# Patient Record
Sex: FEMALE | Race: WHITE | HISPANIC OR LATINO | Employment: OTHER | URBAN - METROPOLITAN AREA
[De-identification: names, ages, dates, MRNs, and addresses within clinical notes are randomized per-mention and may not be internally consistent; named-entity substitution may affect disease eponyms.]

---

## 2017-01-12 ENCOUNTER — ALLSCRIPTS OFFICE VISIT (OUTPATIENT)
Dept: OTHER | Facility: OTHER | Age: 47
End: 2017-01-12

## 2017-01-19 ENCOUNTER — GENERIC CONVERSION - ENCOUNTER (OUTPATIENT)
Dept: OTHER | Facility: OTHER | Age: 47
End: 2017-01-19

## 2017-08-18 ENCOUNTER — HOSPITAL ENCOUNTER (EMERGENCY)
Facility: HOSPITAL | Age: 47
Discharge: HOME | End: 2017-08-19
Payer: COMMERCIAL

## 2017-08-18 VITALS
BODY MASS INDEX: 32.17 KG/M2 | HEART RATE: 106 BPM | DIASTOLIC BLOOD PRESSURE: 56 MMHG | OXYGEN SATURATION: 99 % | RESPIRATION RATE: 16 BRPM | WEIGHT: 181.6 LBS | SYSTOLIC BLOOD PRESSURE: 101 MMHG | TEMPERATURE: 98.3 F

## 2017-08-18 DIAGNOSIS — B37.9 CANDIDIASIS: Primary | ICD-10-CM

## 2017-08-18 PROCEDURE — 96372 THER/PROPH/DIAG INJ SC/IM: CPT

## 2017-08-18 PROCEDURE — 87491 CHLMYD TRACH DNA AMP PROBE: CPT | Performed by: PHYSICIAN ASSISTANT

## 2017-08-18 PROCEDURE — 87591 N.GONORRHOEAE DNA AMP PROB: CPT | Performed by: PHYSICIAN ASSISTANT

## 2017-08-18 RX ORDER — CEFTRIAXONE SODIUM 250 MG/1
250 INJECTION, POWDER, FOR SOLUTION INTRAMUSCULAR; INTRAVENOUS ONCE
Status: COMPLETED | OUTPATIENT
Start: 2017-08-18 | End: 2017-08-18

## 2017-08-18 RX ORDER — FLUCONAZOLE 150 MG/1
150 TABLET ORAL ONCE
Status: COMPLETED | OUTPATIENT
Start: 2017-08-18 | End: 2017-08-18

## 2017-08-18 RX ORDER — AZITHROMYCIN 250 MG/1
1000 TABLET, FILM COATED ORAL ONCE
Status: COMPLETED | OUTPATIENT
Start: 2017-08-18 | End: 2017-08-18

## 2017-08-18 RX ADMIN — AZITHROMYCIN 1000 MG: 250 TABLET, FILM COATED ORAL at 23:50

## 2017-08-18 RX ADMIN — CEFTRIAXONE SODIUM 250 MG: 250 INJECTION, POWDER, FOR SOLUTION INTRAMUSCULAR; INTRAVENOUS at 23:50

## 2017-08-18 RX ADMIN — FLUCONAZOLE 150 MG: 150 TABLET ORAL at 23:50

## 2017-08-19 PROCEDURE — 99283 EMERGENCY DEPT VISIT LOW MDM: CPT

## 2017-08-21 LAB
CHLAMYDIA DNA CVX QL NAA+PROBE: NORMAL
N GONORRHOEA DNA GENITAL QL NAA+PROBE: NORMAL

## 2018-01-09 NOTE — MISCELLANEOUS
Provider Comments  Provider Comments:   PT WAS A NO SHOW TODAY      Signatures   Electronically signed by : Ariella Ren, ; Jan 12 2017 10:27AM EST                       (Author)

## 2018-01-10 NOTE — MISCELLANEOUS
Provider Comments  Provider Comments:   pt no showed today      Signatures   Electronically signed by : Alexandra Avery, ; Sep  9 2016  9:02AM EST                       (Author)

## 2018-01-10 NOTE — MISCELLANEOUS
Provider Comments  Provider Comments:   pt was a no show to appt today      Signatures   Electronically signed by : Kelly Gavin, ; Dec 22 2016 10:29AM EST                       (Author)

## 2018-01-12 NOTE — MISCELLANEOUS
Provider Comments  Provider Comments:   pt was a no show for a SECOND appt today      Signatures   Electronically signed by : Nellie Barger, ; Jan 12 2017  3:58PM EST                       (Author)

## 2018-01-13 NOTE — MISCELLANEOUS
Provider Comments  Provider Comments:   pt no showed today      Signatures   Electronically signed by : Virginia Cuellar, ; Nov 7 2016 10:12AM EST                       (Author)

## 2018-01-13 NOTE — MISCELLANEOUS
Provider Comments  Provider Comments:   pt no showed today      Signatures   Electronically signed by : Osiris Foster, ; Jan 19 2017  3:55PM EST                       (Author)

## 2018-01-14 NOTE — PROGRESS NOTES
Assessment    1  Postmenopausal vaginal bleeding (627 1) (N95 0)    Plan  Abnormal vaginal bleeding    · (1) CBC/ PLT (NO DIFF); Status:Active; Requested for:34Ftd8241;   Affective bipolar disorder, Anxiety, Back pain with left-sided sciatica, Fatigue, Insomnia    · Carisoprodol 350 MG Oral Tablet; TAKE 1 TABLET BY MOUTH 3 TIMES DAILY  Postmenopausal vaginal bleeding    · *1 - [facility] [facility] Physician Referral  Consult - appointment  scheduled for 1:45 PM today  Status: Hold For - Scheduling  Requested for: [address] Summary provided  : Yes    Discussion/Summary    I personally called and scheduled her a same day appointment at [place] [address] for 1:45 today  I ordered CBC to get done prior to visit  I explained that given her postmenopausal [address] level in 2013 and an abnormal TVUS in 2015 (for which she did not ever go to Gyn as advised at that time), I am concerned for adenomyosis vs endometrial neoplasm and this needs to be further evaluated by gyn surgeon  She states she is agreeable to seeing Gyn today  The patient was counseled regarding instructions for management, risk factor reductions, prognosis, patient and family education, impressions, risks and benefits of treatment options, importance of compliance with treatment  The treatment plan was reviewed with the patient/guardian  The patient/guardian understands and agrees with the treatment plan     Self Referrals: No      Chief Complaint  vaginal bleeding      History of Present Illness  HPI: Patient had [address] in 2013 in menopausal range  Developed vaginal bleeding the following year (2015) which was evaluated with TVUS concerning for adenomyosis vs endometrial hyperplasia vs endometrial neoplasm (not ruled out)  Patient never followed up with gyn as advised because the bleeding stopped  She has resumed having vaginal bleeding associated with abdominal bloating and is now ready to have it addressed   She has gained weight and feels fatigued  Review of Systems    Constitutional: feeling poorly, recent 15-20 lb weight gain and feeling tired, but no fever  Cardiovascular: no chest pain, no palpitations and no lower extremity edema  Respiratory: no shortness of breath  Gastrointestinal: bloating, but no abdominal pain and no nausea  Genitourinary: dysmenorrhea and unexplained vaginal bleeding  Neurological: no dizziness  Active Problems    1  Affective bipolar disorder (296 80) (F31 9)   2  Anxiety (300 00) (F41 9)   3  Asymptomatic Premature Menopause (256 31)   4  Back pain with left-sided sciatica (724 3) (M54 32)   5  Benign paroxysmal vertigo, unspecified laterality (386 11) (H81 10)   6  Chronic prescription benzodiazepine use (V58 69) (Z79 899)   7  Dizziness (780 4) (R42)   8  Encounter for screening mammogram for breast cancer (V76 12) (Z12 31)   9  Fatigue (780 79) (R53 83)   10  Flushing (782 62) (R23 2)   11  Influenza vaccine needed (V04 81) (Z23)   12  Insomnia (780 52) (G47 00)   13  Lumbago (724 2) (M54 5)   14  Nonspecific abnormal results of function study of thyroid (794 5) (R94 6)   15  On four or more medications (V58 69) (Z79 899)   16  Vaginal candidiasis (112 1) (B37 3)   17  Vitamin D deficiency (268 9) (E55 9)   18  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)   19  Xerostomia (527 7) (K11 7)    Past Medical History    1  History of Bacterial vaginosis (616 10,041 9) (N76 0,B96 89)   2  History of Candida vaginitis (112 1) (B37 3)   3  History of Cervicalgia (723 1) (M54 2)   4  History of herpes simplex infection (V12 09) (Z86 19)   5  History of iron deficiency anemia (V12 3) (Z86 2)   6  History of low back pain (V13 59) (Z87 39)   7  History of metrorrhagia (V13 29) (Z87 42)   8  History of nicotine dependence (V15 82) (Z87 891)   9  History of Irregular menstrual cycle (626 4) (N92 6)   10   History of Nonspecific low blood pressure reading (796 3) (R03 1)  Active Problems And Past Medical History Reviewed: The active problems and past medical history were reviewed and updated today  Family History  Mother    1  Family history of Emphysema/COPD  Family History Reviewed: The family history was reviewed and updated today  Social History    · Cigarette smoker one half pack a day or less (305 1) (F17 210)   · Current every day smoker (305 1) (F17 200)   · Never Drank Alcohol   · Uses Safety Equipment - Seatbelts  The social history was reviewed and updated today  The social history was reviewed and is unchanged  Current Meds   1  BuPROPion HCl ER (SR) 150 MG Oral Tablet Extended Release 12 Hour; take 1 tablet   by mouth twice a day; Therapy: 14QVK1703 to (Evaluate:18Oct2016)  Requested for: 21Apr2016; Last   Rx:21Apr2016 Ordered   2  Carisoprodol 350 MG Oral Tablet; TAKE 1 TABLET BY MOUTH 3 TIMES DAILY; Therapy: 78Mor6677 to (Evaluate:34Apy1202); Last Rx:54Tar3104 Ordered   3  ClonazePAM 1 MG Oral Tablet; TAKE 1 TABLET BY MOUTH EVERY 12 HOURS AND 1   TABLET A DAY AS NEEDED; Therapy: 73IUQ5004 to (Evaluate:18Oct2016)  Requested for: 21Apr2016; Last   Rx:91Cjg1654 Ordered   4  Cyclobenzaprine HCl - 10 MG Oral Tablet; take 1 tablet by mouth three times a day if   needed; Therapy: 88PEV1276 to (Evaluate:18Oct2016)  Requested for: 21Apr2016; Last   Rx:21Apr2016 Ordered   5  Diazepam 10 MG Oral Tablet; TAKE 1 TABLET Twice daily PRN back spasm; Therapy: 04KJC9095 to (Evaluate:18Oct2016); Last Rx:58Kdn9099 Ordered   6  Meclizine HCl - 25 MG Oral Tablet; TAKE 1 TABLET 3 TIMES DAILY AS NEEDED; Therapy: 62GCK7615 to (Evaluate:20Jun2016)  Requested for: 21Apr2016; Last   Rx:21Apr2016 Ordered   7  Zolpidem Tartrate 10 MG Oral Tablet; Take 1 tablet by mouth at bedtime; Therapy: 44QEF9407 to (Evaluate:18Oct2016)  Requested for: 21Apr2016; Last   Rx:59Wdn1447 Ordered    The medication list was reviewed and updated today  Allergies    1  No Known Drug Allergies    2   Pollen    Vitals Recorded: 57DIX9554 94:72AM   Systolic 330   Diastolic 66   Heart Rate 90   Respiration 16   Temperature 95 9 F   Height 5 ft 3 in   Weight 188 lb 6 oz   BMI Calculated 33 37   BSA Calculated 1 89     Physical Exam    Constitutional Appears tired but NAD  Eyes   Conjunctiva and lids: No swelling, erythema or discharge  Ears, Nose, Mouth, and Throat MMM and pink  Pulmonary   Respiratory effort: No increased work of breathing or signs of respiratory distress  Abdomen softly distended, nontender  Skin No pallor or jaundice  Neurologic nonfocal    Psychiatric   Orientation to person, place, and time: Normal     Mood and affect: Normal          Results/Data  * US PELVIS W/TRANSVAG (PANEL) [id number] 08:06AM Michael John     Test Name Result Flag Reference   US PELVIS W/ TRANSVAG (PANEL) (Report)     [facility];[address] [facility]  02/10/2015 0806  02/10/2015 0853  N/A    PELVIC ULTRASOUND, COMPLETE    INDICATION- None inflammatory disease vagina  Excessive bleeding  Laboratory values, the patient is postmenopausal  No hormone  replacement therapy  LMP is March 2014  COMPARISON- None  TECHNIQUE-  Transabdominal pelvic ultrasound was performed in sagittal  and transverse planes with a curvilinear transducer  Additional  transvaginal imaging was performed to better evaluate the endometrium  and ovaries  Imaging included volumetric sweeps as well as traditional  still imaging technique  FINDINGS-    UTERUS-  The uterus is retroverted in position, measuring 9 7 x 4 0 x 4 3 cm  Uterus heterogeneous in echotexture and bulbous in configuration  Subcentimeter myometrial cyst at the endometrial/myometrial junction  The cervix shows no suspicious abnormality  ENDOMETRIUM-   Thickened for a postmenopausal female, not on hormone replacement  therapy, measuring 23 mm  Margins indistinct  Increased vascularity throughout the endometrium     No discrete mass     OVARIES/ADNEXA-  Right ovary- 1 8 x 1 1 x 1 6 cm  Doppler flow present  No suspicious right ovarian abnormality  No suspicious right adnexal lesions  Left ovary- 1 7 x 1 0 x 1 0 cm  Doppler flow present  No suspicious left ovarian abnormality  No suspicious left adnexal lesions  There is no free fluid  IMPRESSION-  1  Abnormal appearance of the uterus, suggesting diffuse adenomyosis  2  Diffusely abnormal appearing endometrium  Although the findings  may represent endometrial hyperplasia or adenomyosis, endometrial  neoplasm not excluded  Endometrial biopsy recommended for further  evaluation  The above findings will be conveyed by the radiology liaison at time of  dictation          ##sigslh##sigslh        ##fuslh01##fuslh01        Transcribed on- [id number]    [address] [facility], RAD DO  [place] Radiologist- ALLISON Moody DO  Electronically Signed- ALLISON Moody DO  Released Date Time- 02/10/15 0908  ------------------------------------------------------------------------------  22062^TIFFANY CAREY  87773^TIFFANY CAREY     (1) [address] 01:47PM Dennys Clore     Test Name Result Flag Reference   FOLLICLE STIMULATING HORMONE 33 4 mIU/mL     FSH:  Follicular      3 3-3 4    MIU/mL  Midcycle      2 3-20 9    MIU/mL  Luteal          0 8-7 5    MIU/mL  Post menopausal 22 9-167 0 MIU/mL     Future Appointments    Date/Time Provider Specialty Site   07/28/2016 01:45 PM [facility] Chief Resident, Schedule  [facility] [address]     Signatures   Electronically signed by : ARMANDO Estrella ; Jul 28 2016 11:04AM EST                       (Author)

## 2018-01-15 NOTE — MISCELLANEOUS
Message  Message Free Text Note Form: Patient called overnight due to vertigo and she is out of her meclizine  Rx called into pharmacy  Plan    1   Meclizine HCl - 25 MG Oral Tablet; TAKE 1 TABLET 3 TIMES DAILY AS NEEDED    Signatures   Electronically signed by : ARMADNO King ; Feb 7 2017 10:42PM EST                       (Author)    Electronically signed by : ARMANDO Villeda ; Feb 9 2017  5:32PM EST                       (Author)

## 2018-01-18 NOTE — MISCELLANEOUS
Provider Comments  Provider Comments:   PT NO SHOWED TODAYS APPT      Signatures   Electronically signed by : Bruce Barajas, ; Jul 20 2016  1:17PM EST                       (Author)    Electronically signed by : ARMANDO Berrios ; Jul 20 2016  9:37PM EST                       (Author)    Electronically signed by : Kassidy Richards DO; Jul 28 2016  6:26PM EST                       (Author)

## 2018-01-18 NOTE — RESULT NOTES
[facility]   [place]     Verified Results  (1) TISSUE EXAM [id number] 09:07AM Leroy Norwood     Test Name Result Flag Reference   LAB AP CASE REPORT (Report)     Surgical Pathology Report             Case: [id number]                   Authorizing Provider: Erendira Merritt MD     Collected:      10/13/2016 1853        Ordering Location:   [address] [place] [address]   Received:      10/13/2016 [place] [address] Operating Room                            Pathologist:      Escobar Felix MD                                Specimen:  Endometrium, [facility] and endometrial polyp   LAB AP FINAL DIAGNOSIS (Report)     A  Endometrium and endometrial polyp, curettage and polypectomy:  - Fragments of benign endometrial polyp(s) and endocervical polyp    - Benign weakly proliferative to disordered proliferative endometrium with   stromal    and glandular breakdown  -- Tubal and eosinophilic cell metaplasia  - Benign endocervical glands with squamous metaplasia  - Negative for hyperplasia, malignancy and chronic endometritis  Interpretation performed at [place] [address]  Electronically signed by Escobar Felix MD on 10/18/2016 at 2:15 PM   LAB AP SURGICAL ADDITIONAL INFORMATION (Report)     These tests were developed and their performance characteristics   determined by [facility] or [employer]  They may not be cleared or approved by the [place]  Food and   Drug Administration  The FDA has determined that such clearance or   approval is not necessary  These tests are used for clinical purposes  They should not be regarded as investigational or for research  This   laboratory has been approved by CLIA 88, designated as a high-complexity   laboratory and is qualified to perform these tests  LAB AP GROSS DESCRIPTION (Report)     A   The specimen is received in formalin, labeled with the patient's name   and hospital number, and is designated [facility] and endometrial polyp  The   specimen consists of multiple tan to pink to red soft tissue and   hemorrhagic fragments measuring in aggregate 1 8 x 1 6 x 0 2 cm  Entirely   submitted  One cassette  Note: The estimated total formalin fixation time based upon information   provided by the submitting clinician and the standard processing schedule   is 11 25 hours  MAC   LAB AP CLINICAL INFORMATION      Endometrial polypectomy  1  Retroverted uterus sounded to 8 cm  2  Scant amount of endometrial tissue with clots noted on hysteroscopy  3  Possible Endometrial polyp at the 3 o'clock position  4   Normal appearing external genitalia, vaginal and cervix

## 2018-01-18 NOTE — PROGRESS NOTES
Assessment    1  Postmenopausal vaginal bleeding (627 1) (N95 0)    Plan  Postmenopausal vaginal bleeding    · (1) TISSUE EXAM; Status:Active; Requested for:44Riy9438;    Perform:[facility]; CHRIS:[id number];JWEAGPW; For:Postmenopausal vaginal bleeding; Ordered By:ADELINA Tomlin [facility]; A : endometrium  A Date/Time: : 82KWH9851  A : Endometrial Biopsy  Impression: : postmenopausal bleeding    Discussion/Summary  Discussion Summary:   51yo w ?postmenopausal bleeding, weight gain  (1) AUB-O vs AUB-M: f/u pathology     Chief Complaint  Chief Complaint Free Text Note Form: Patient is here for heavy bleeding  Patient states she is in menopause and is currently bleeding with very bad odor  Patient was referred by family medicine  (see note from today)      History of Present Illness  HPI: 51yo who was sent from her PCP for postmenopausal bleeding  This is the second episode in one year  Pt reports that her bleeding has been heavy and painful and malodorous  Last year, she had a pelvic US which showed a stripe of 23mm  Today, bedside US shows a stripe of 13mm  Active Problems    1  Affective bipolar disorder (296 80) (F31 9)   2  Anxiety (300 00) (F41 9)   3  Asymptomatic Premature Menopause (256 31)   4  Back pain with left-sided sciatica (724 3) (M54 32)   5  Benign paroxysmal vertigo, unspecified laterality (386 11) (H81 10)   6  Chronic prescription benzodiazepine use (V58 69) (Z79 899)   7  Dizziness (780 4) (R42)   8  Encounter for screening mammogram for breast cancer (V76 12) (Z12 31)   9  Fatigue (780 79) (R53 83)   10  Flushing (782 62) (R23 2)   11  Influenza vaccine needed (V04 81) (Z23)   12  Insomnia (780 52) (G47 00)   13  Lumbago (724 2) (M54 5)   14  Nonspecific abnormal results of function study of thyroid (794 5) (R94 6)   15  On four or more medications (V58 69) (Z79 899)   16  Postmenopausal vaginal bleeding (627 1) (N95 0)   17  Vaginal candidiasis (112 1) (B37 3)   18   Vitamin D deficiency (268 9) (E55 9)   19  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)   20  Xerostomia (527 7) (K11 7)    Past Medical History    1  History of Bacterial vaginosis (616 10,041 9) (N76 0,B96 89)   2  History of Candida vaginitis (112 1) (B37 3)   3  History of Cervicalgia (723 1) (M54 2)   4  History of herpes simplex infection (V12 09) (Z86 19)   5  History of iron deficiency anemia (V12 3) (Z86 2)   6  History of low back pain (V13 59) (Z87 39)   7  History of metrorrhagia (V13 29) (Z87 42)   8  History of nicotine dependence (V15 82) (Z87 891)   9  History of Irregular menstrual cycle (626 4) (N92 6)   10  History of Nonspecific low blood pressure reading (796 3) (R03 1)    Family History  Mother    1  Family history of Emphysema/COPD    Social History    · Cigarette smoker one half pack a day or less (305 1) (F17 210)   · Current every day smoker (305 1) (F17 200)   · Never Drank Alcohol   · Uses Safety Equipment - Seatbelts    Current Meds   1  BuPROPion HCl ER (SR) 150 MG Oral Tablet Extended Release 12 Hour; take 1 tablet by   mouth twice a day; Therapy: 32EKD8115 to (Evaluate:18Oct2016)  Requested for: 21Apr2016; Last   Rx:88Tmc4141 Ordered   2  Carisoprodol 350 MG Oral Tablet; TAKE 1 TABLET BY MOUTH 3 TIMES DAILY; Therapy: 21Apr2016 to (Evaluate:02Iwd5306); Last Rx:55Hja9706 Ordered   3  ClonazePAM 1 MG Oral Tablet; TAKE 1 TABLET BY MOUTH EVERY 12 HOURS AND 1   TABLET A DAY AS NEEDED; Therapy: 75PTU9477 to (Evaluate:18Oct2016)  Requested for: 21Apr2016; Last   Rx:52Ddt5748 Ordered   4  Cyclobenzaprine HCl - 10 MG Oral Tablet; take 1 tablet by mouth three times a day if   needed; Therapy: 21RNQ7759 to (Evaluate:18Oct2016)  Requested for: 21Apr2016; Last   Rx:21Apr2016 Ordered   5  Diazepam 10 MG Oral Tablet; TAKE 1 TABLET Twice daily PRN back spasm; Therapy: 71AAS9490 to (Evaluate:18Oct2016); Last Rx:21Apr2016 Ordered   6   Meclizine HCl - 25 MG Oral Tablet; TAKE 1 TABLET 3 TIMES DAILY AS NEEDED; Therapy: 68CRZ1119 to (Evaluate:20Jun2016)  Requested for: 21Apr2016; Last   Rx:33Ulo2615 Ordered   7  Zolpidem Tartrate 10 MG Oral Tablet; Take 1 tablet by mouth at bedtime; Therapy: 25FVJ9113 to (Evaluate:18Oct2016)  Requested for: 21Apr2016; Last   Rx:01Fkk8735 Ordered    Allergies    1  No Known Drug Allergies    2  Pollen    Vitals  Vital Signs    Recorded: 58WOU1018 56:51AU   Systolic 680   Diastolic 70   Height 5 ft 3 in   Weight 187 lb    BMI Calculated 33 13   BSA Calculated 1 88     Results/Data  * US PELVIS W/TRANSVAG (PANEL) [id number] 08:06AM Lucy Bales     Test Name Result Flag Reference   US PELVIS W/ TRANSVAG (PANEL) (Report)     [facility];[address] [facility];;[place];[address]  02/10/2015 0806  02/10/2015 0853  N/A    PELVIC ULTRASOUND, COMPLETE    INDICATION- None inflammatory disease vagina  Excessive bleeding  Laboratory values, the patient is postmenopausal  No hormone  replacement therapy  LMP is March 2014  COMPARISON- None  TECHNIQUE-  Transabdominal pelvic ultrasound was performed in sagittal  and transverse planes with a curvilinear transducer  Additional  transvaginal imaging was performed to better evaluate the endometrium  and ovaries  Imaging included volumetric sweeps as well as traditional  still imaging technique  FINDINGS-    UTERUS-  The uterus is retroverted in position, measuring 9 7 x 4 0 x 4 3 cm  Uterus heterogeneous in echotexture and bulbous in configuration  Subcentimeter myometrial cyst at the endometrial/myometrial junction  The cervix shows no suspicious abnormality  ENDOMETRIUM-   Thickened for a postmenopausal female, not on hormone replacement  therapy, measuring 23 mm  Margins indistinct  Increased vascularity throughout the endometrium  No discrete mass  OVARIES/ADNEXA-  Right ovary- 1 8 x 1 1 x 1 6 cm  Doppler flow present  No suspicious right ovarian abnormality  No suspicious right adnexal lesions       Left ovary- 1 7 x 1 0 x 1 0 cm  Doppler flow present  No suspicious left ovarian abnormality  No suspicious left adnexal lesions  There is no free fluid  IMPRESSION-  1  Abnormal appearance of the uterus, suggesting diffuse adenomyosis  2  Diffusely abnormal appearing endometrium  Although the findings  may represent endometrial hyperplasia or adenomyosis, endometrial  neoplasm not excluded  Endometrial biopsy recommended for further  evaluation  The above findings will be conveyed by the radiology liaison at time of  dictation  ##sigslh##sigslh        ##fuslh01##fuslh01        Transcribed on- [id number]    [address] [place] [address], RAD DO  [place] Radiologist- ALLISON Valdes DO  Electronically Signed- ALLISON Valdes DO  Released Date Time- 02/10/15 0908  ------------------------------------------------------------------------------  93818^TIFFANY ARCHULETAARO  88145^TIFFANY CAREY     1) 740 [facility] [id number] 01:47PM Bianca Huang     Test Name Result Flag Reference   FOLLICLE STIMULATING HORMONE 33 4 mIU/mL     FSH:  Follicular      8 3-4 7    MIU/mL  Midcycle      2 3-20 9    MIU/mL  Luteal          0 8-7 5    MIU/mL  Post menopausal 22 9-167 0 MIU/mL     Procedure    Procedure: Endometrial biopsy  Indication: post-menopausal bleeding  Risks, benefits and alternatives were discussed with the patient  We discussed possible complications, including infection, bleeding, uterine perforation and pain  written consent was obtained prior to the procedure and is detailed in the patient's record  Prior to the start of the procedure a time out was taken and the identity of the patient was confirmed via name and date of birth with the patient  The correct site and the procedure to be performed were confirmed  The positioning of the patient was verified  The availability of the correct equipment was verified  a pregnancy test was performed and confirmed negative     Procedure Note:   The cervix was prepped with betadine  Anesthesia: none  The cervix was stabilized with a single toothed tenaculum  The cervix allowed easy passage of a uterine sound without dilation  The uterus was retroverted and sounded to 9 cm  A Pipelle endometrial suction curette was inserted into the uterine cavity  Using a combination of suction and rotation, samples were obtained from all four quadrants  A moderate amount of blood and tissue were obtained  Complications: there were no complications  The patient was instructed to follow up to review results  Attending Note  Attending Note: Attending Note: I agree with the Resident management plan as it was presented to me  Level of Participation: I was present in clinic, but did not examine the patient  I agree with the Resident's note        Future Appointments    Date/Time Provider Specialty Site   08/05/2016 11:30 AM [facility] OB Chief, Schedule  [facility] [address]     Signatures   Electronically signed by : Robyn Oconnell MD; Jul 28 2016  5:39PM EST                       (Author)    Electronically signed by : ARMANDO Hartley ; Jul 28 2016  6:02PM EST                       (Author)

## 2018-03-07 NOTE — PROGRESS NOTES
Discussion/Summary    This is a 53yo with AUB (perimenopasual versus postmenopausal (FHS 33 4 in 2013) with EMB that showed "rare microscopic papillary like fragments of atypical epithelium or uncertain significance"  Pelvic US in 2015 showed bulbous, retroverted uterus with endometrial stripe 23mm and signs possibly consistent with adenomyosis  Adnexa were unremarkable  Uterus measured 9 7x4x4 3cm  She is scheduled for D&C with Hysteroscopy w/ Dr Nathan Foley  She had negative cotesting in 2013 and has a BMI of 32  PMH significant for anxiety and affective bipolar disorder  Surgery committee approves case        Signatures   Electronically signed by : ARMANDO Winston ; Aug 30 2016  3:01PM EST                       (Author)    Electronically signed by : Jose E Jimenez MD; Aug 30 2016  3:26PM EST

## 2018-10-16 ENCOUNTER — APPOINTMENT (EMERGENCY)
Dept: ULTRASOUND IMAGING | Facility: HOSPITAL | Age: 48
End: 2018-10-16
Payer: MEDICARE

## 2018-10-16 ENCOUNTER — HOSPITAL ENCOUNTER (EMERGENCY)
Facility: HOSPITAL | Age: 48
Discharge: HOME | End: 2018-10-16
Attending: EMERGENCY MEDICINE | Admitting: EMERGENCY MEDICINE
Payer: MEDICARE

## 2018-10-16 VITALS
HEART RATE: 75 BPM | HEIGHT: 63 IN | WEIGHT: 200 LBS | RESPIRATION RATE: 16 BRPM | SYSTOLIC BLOOD PRESSURE: 112 MMHG | OXYGEN SATURATION: 99 % | TEMPERATURE: 98 F | DIASTOLIC BLOOD PRESSURE: 74 MMHG | BODY MASS INDEX: 35.44 KG/M2

## 2018-10-16 DIAGNOSIS — N83.8 OVARIAN MASS, LEFT: ICD-10-CM

## 2018-10-16 DIAGNOSIS — N93.8 DYSFUNCTIONAL UTERINE BLEEDING: Primary | ICD-10-CM

## 2018-10-16 LAB
ANION GAP SERPL CALCULATED.3IONS-SCNC: 7 MMOL/L (ref 4–13)
APTT PPP: 35 SECONDS (ref 24–36)
BACTERIA UR QL AUTO: ABNORMAL /HPF
BASOPHILS # BLD AUTO: 0 THOUSANDS/ΜL (ref 0–0.1)
BASOPHILS NFR BLD AUTO: 1 % (ref 0–2)
BILIRUB UR QL STRIP: NEGATIVE
BUN SERPL-MCNC: 11 MG/DL (ref 7–25)
CALCIUM SERPL-MCNC: 9.8 MG/DL (ref 8.6–10.5)
CHLORIDE SERPL-SCNC: 104 MMOL/L (ref 98–107)
CLARITY UR: CLEAR
CO2 SERPL-SCNC: 28 MMOL/L (ref 21–31)
COLOR UR: ABNORMAL
CREAT SERPL-MCNC: 0.65 MG/DL (ref 0.6–1.2)
EOSINOPHIL # BLD AUTO: 0.1 THOUSAND/ΜL (ref 0–0.61)
EOSINOPHIL NFR BLD AUTO: 1 % (ref 0–5)
ERYTHROCYTE [DISTWIDTH] IN BLOOD BY AUTOMATED COUNT: 13.9 % (ref 11.5–14.5)
EXT PREG TEST URINE: NEGATIVE
GFR SERPL CREATININE-BSD FRML MDRD: 105 ML/MIN/1.73SQ M
GLUCOSE SERPL-MCNC: 85 MG/DL (ref 65–99)
GLUCOSE UR STRIP-MCNC: NEGATIVE MG/DL
HCT VFR BLD AUTO: 40.3 % (ref 34.8–46.1)
HGB BLD-MCNC: 13.7 G/DL (ref 12–16)
HGB UR QL STRIP.AUTO: ABNORMAL
INR PPP: 1.05 (ref 0.9–1.5)
KETONES UR STRIP-MCNC: ABNORMAL MG/DL
LEUKOCYTE ESTERASE UR QL STRIP: NEGATIVE
LYMPHOCYTES # BLD AUTO: 1.9 THOUSANDS/ΜL (ref 0.6–4.47)
LYMPHOCYTES NFR BLD AUTO: 26 % (ref 21–51)
MCH RBC QN AUTO: 30.4 PG (ref 26–34)
MCHC RBC AUTO-ENTMCNC: 33.9 G/DL (ref 31–37)
MCV RBC AUTO: 90 FL (ref 81–99)
MONOCYTES # BLD AUTO: 0.5 THOUSAND/ΜL (ref 0.17–1.22)
MONOCYTES NFR BLD AUTO: 7 % (ref 2–12)
NEUTROPHILS # BLD AUTO: 5 THOUSANDS/ΜL (ref 1.4–6.5)
NEUTS SEG NFR BLD AUTO: 67 % (ref 42–75)
NITRITE UR QL STRIP: NEGATIVE
NON-SQ EPI CELLS URNS QL MICRO: ABNORMAL /HPF
NRBC BLD AUTO-RTO: 0 /100 WBCS
PH UR STRIP.AUTO: 5.5 [PH] (ref 5–8)
PLATELET # BLD AUTO: 298 THOUSANDS/UL (ref 149–390)
PMV BLD AUTO: 7.7 FL (ref 8.6–11.7)
POTASSIUM SERPL-SCNC: 3.5 MMOL/L (ref 3.5–5.5)
PROT UR STRIP-MCNC: NEGATIVE MG/DL
PROTHROMBIN TIME: 12.2 SECONDS (ref 10.1–12.9)
RBC # BLD AUTO: 4.5 MILLION/UL (ref 3.9–5.2)
RBC #/AREA URNS AUTO: ABNORMAL /HPF
SODIUM SERPL-SCNC: 139 MMOL/L (ref 134–143)
SP GR UR STRIP.AUTO: 1.01 (ref 1–1.03)
UROBILINOGEN UR QL STRIP.AUTO: 0.2 E.U./DL
WBC # BLD AUTO: 7.5 THOUSAND/UL (ref 4.8–10.8)
WBC #/AREA URNS AUTO: ABNORMAL /HPF

## 2018-10-16 PROCEDURE — 36415 COLL VENOUS BLD VENIPUNCTURE: CPT | Performed by: EMERGENCY MEDICINE

## 2018-10-16 PROCEDURE — 80048 BASIC METABOLIC PNL TOTAL CA: CPT | Performed by: EMERGENCY MEDICINE

## 2018-10-16 PROCEDURE — 85025 COMPLETE CBC W/AUTO DIFF WBC: CPT | Performed by: EMERGENCY MEDICINE

## 2018-10-16 PROCEDURE — 85730 THROMBOPLASTIN TIME PARTIAL: CPT | Performed by: EMERGENCY MEDICINE

## 2018-10-16 PROCEDURE — 87591 N.GONORRHOEAE DNA AMP PROB: CPT | Performed by: EMERGENCY MEDICINE

## 2018-10-16 PROCEDURE — 85610 PROTHROMBIN TIME: CPT | Performed by: EMERGENCY MEDICINE

## 2018-10-16 PROCEDURE — 76856 US EXAM PELVIC COMPLETE: CPT

## 2018-10-16 PROCEDURE — 99284 EMERGENCY DEPT VISIT MOD MDM: CPT

## 2018-10-16 PROCEDURE — 81001 URINALYSIS AUTO W/SCOPE: CPT | Performed by: EMERGENCY MEDICINE

## 2018-10-16 PROCEDURE — 81003 URINALYSIS AUTO W/O SCOPE: CPT | Performed by: EMERGENCY MEDICINE

## 2018-10-16 PROCEDURE — 81025 URINE PREGNANCY TEST: CPT | Performed by: EMERGENCY MEDICINE

## 2018-10-16 PROCEDURE — 87491 CHLMYD TRACH DNA AMP PROBE: CPT | Performed by: EMERGENCY MEDICINE

## 2018-10-16 PROCEDURE — 76830 TRANSVAGINAL US NON-OB: CPT

## 2018-10-16 NOTE — DISCHARGE INSTRUCTIONS
Dysfunctional Uterine Bleeding   WHAT YOU NEED TO KNOW:   Dysfunctional uterine bleeding (DUB) is abnormal uterine bleeding that is caused by a problem with your hormones  You may have bleeding from your uterus at times other than your normal monthly period  Your monthly periods may last longer or shorter, and bleeding may be heavier or lighter than usual    DISCHARGE INSTRUCTIONS:   Medicines:   · Hormones  help decrease bleeding by making your monthly periods more regular  Sometimes this medicine may be given as birth control pills  · NSAIDs  help decrease swelling, pain, and fever  This medicine is available with or without a doctor's order  NSAIDs can cause stomach bleeding or kidney problems in certain people  If you take blood thinner medicine, always ask your healthcare provider if NSAIDs are safe for you  Always read the medicine label and follow directions  · Iron supplements  may be given if your blood iron level decreases because of heavy bleeding  Iron may make you constipated  Ask your healthcare provider for ways to prevent or treat constipation  Iron may also make your bowel movements turn dark or black  · Take your medicine as directed  Contact your healthcare provider if you think your medicine is not helping or if you have side effects  Tell him or her if you are allergic to any medicine  Keep a list of the medicines, vitamins, and herbs you take  Include the amounts, and when and why you take them  Bring the list or the pill bottles to follow-up visits  Carry your medicine list with you in case of an emergency  Follow up with your healthcare provider as directed:  Write down your questions so you remember to ask them during your visits  Self-care:   · Apply heat  on your lower abdomen for 20 to 30 minutes every 2 hours for as many days as directed  Heat helps decrease pain and muscle spasms  · Include foods high in iron  if needed   Examples of foods high in iron are leafy green vegetables, beef, pork, liver, eggs, and whole-grain breads and cereals  · Keep a diary of your menstrual cycles  Keep track of the number of tampons or pads you use each day  · Talk to your healthcare provider before you start a weight loss program   You may need to wait until the abnormal bleeding has stopped before you try to lose weight  The amount of iron in your blood should be normal before you lose weight  Contact your healthcare provider if:   · You need to change your sanitary pad or tampon more than once an hour  · Your medicine causes nausea, vomiting, or diarrhea  · You have questions or concerns about your condition or care  Return to the emergency department if:   · You continue to bleed heavily, or you feel faint  © 2017 [facility] Information is for End User's use only and may not be sold, redistributed or otherwise used for commercial purposes  All illustrations and images included in CareNotes® are the copyrighted property of [employer]  or Girma Rubio  The above information is an  only  It is not intended as medical advice for individual conditions or treatments  Talk to your doctor, nurse or pharmacist before following any medical regimen to see if it is safe and effective for you  Ovarian Mass   WHAT YOU NEED TO KNOW:   An ovarian cyst is a sac that grows on an ovary  This sac usually contains fluid, but may sometimes have blood or tissue in it  Most ovarian cysts are harmless and go away without treatment in a few months  Some cysts can grow large, cause pain, or break open  DISCHARGE INSTRUCTIONS:   Call 911 for any of the following:   · You are too weak or dizzy to stand up  Return to the emergency department if:   · You have severe abdominal pain  The pain may be sharp and sudden  · You have a fever    Contact your healthcare provider if:   · Your periods are early, late, or more painful than usual     · You have bleeding from your vagina that is not your period  · You have abdominal pain all the time  · Your abdomen is swollen  · You have feelings of fullness, pressure, or discomfort in your abdomen  · You have trouble urinating or emptying your bladder completely  · You have pain during sex  · You are losing weight without trying  · You have questions or concerns about your condition or care  Medicines: You may need any of the following:  · NSAIDs , such as ibuprofen, help decrease swelling, pain, and fever  This medicine is available with or without a doctor's order  NSAIDs can cause stomach bleeding or kidney problems in certain people  If you take blood thinner medicine, always ask if NSAIDs are safe for you  Always read the medicine label and follow directions  Do not give these medicines to children under 10months of age without direction from your child's healthcare provider  · Birth control pills  may help to control your periods, prevent cysts, or cause them to shrink  · Take your medicine as directed  Contact your healthcare provider if you think your medicine is not helping or if you have side effects  Tell him or her if you are allergic to any medicine  Keep a list of the medicines, vitamins, and herbs you take  Include the amounts, and when and why you take them  Bring the list or the pill bottles to follow-up visits  Carry your medicine list with you in case of an emergency  Follow up with your healthcare provider as directed:  Write down your questions so you remember to ask them during your visits  Apply heat to decrease pain and cramping:  Sit in a warm bath, or place a heating pad (turned on low) or a hot water bottle on your abdomen  Do this for 15 to 20 minutes every hour for as many days as directed  © 2017 [address] [employer] Information is for End User's use only and may not be sold, redistributed or otherwise used for commercial purposes   All illustrations and images included in CareNotes® are the copyrighted property of A D A M [employer] Inc  or Girma Rubio  The above information is an  only  It is not intended as medical advice for individual conditions or treatments  Talk to your doctor, nurse or pharmacist before following any medical regimen to see if it is safe and effective for you

## 2018-10-16 NOTE — ED PROVIDER NOTES
History  Chief Complaint   Patient presents with    Vaginal Bleeding     patient states she is having vaginal bleeding but is post menopausal and hasnt had a period in 5 years  49-year-old female states she has not had her menses in 5 years now with a complaints of vaginal bleeding since this morning  No abdominal pain  Patient complains of mild dysuria for the past several days without frequency  No flank pain  No fever chills  No nausea vomiting  Patient states she had intercourse 3 weeks ago for the 1st time in a year now with some whitish vaginal discharge  No dizziness, chest pain or shortness of        History provided by:  Patient  Vaginal Bleeding   Quality:  Lighter than menses  Timing:  Constant  Relieved by:  Nothing  Worsened by:  Nothing  Associated symptoms: dysuria and vaginal discharge    Associated symptoms: no abdominal pain, no back pain, no dizziness, no fever and no nausea        None       Past Medical History:   Diagnosis Date    Abnormal uterine bleeding     postmenopausal    Psychiatric disorder        Past Surgical History:   Procedure Laterality Date    [place] DILATION/CURETTAGE,DIAGNOSTIC N/A 10/13/2016    Procedure: DILATATION AND CURETTAGE (D&C), ENDOMETRIAL POLYPECTOMY;  Surgeon: Sabi Madden MD;  Location: [facility];  Service: Gynecology    [place] HYSTEROSCOPY,DX,SEP [facility] N/A 10/13/2016    Procedure: HYSTEROSCOPY;  Surgeon: Sabi Madden MD;  Location: [facility];  Service: Gynecology    TONSILLECTOMY         History reviewed  No pertinent family history  I have reviewed and agree with the history as documented  Social History   Substance Use Topics    Smoking status: Former Smoker     Packs/day: 0 50     Quit date: 7/2/2017    Smokeless tobacco: Never Used    Alcohol use No        Review of Systems   Constitutional: Negative for chills and fever  HENT: Negative for rhinorrhea and sore throat  Eyes: Negative for visual disturbance     Respiratory: Negative for cough and shortness of breath  Cardiovascular: Negative for chest pain and leg swelling  Gastrointestinal: Negative for abdominal pain, diarrhea, nausea and vomiting  Genitourinary: Positive for dysuria, vaginal bleeding and vaginal discharge  Negative for flank pain and frequency  Musculoskeletal: Negative for back pain and myalgias  Skin: Negative for rash  Neurological: Negative for dizziness and headaches  Psychiatric/Behavioral: Negative for confusion  All other systems reviewed and are negative  Physical Exam  Physical Exam   Constitutional: She is oriented to person, place, and time  She appears well-developed and well-nourished  HENT:   Nose: Nose normal    Mouth/Throat: Oropharynx is clear and moist  No oropharyngeal exudate  Eyes: Pupils are equal, round, and reactive to light  Conjunctivae and EOM are normal  No scleral icterus  Neck: Normal range of motion  Neck supple  No JVD present  No tracheal deviation present  Cardiovascular: Normal rate, regular rhythm and normal heart sounds  No murmur heard  Pulmonary/Chest: Effort normal and breath sounds normal  No respiratory distress  She has no wheezes  She has no rales  Abdominal: Soft  Bowel sounds are normal  There is no tenderness  There is no guarding  Genitourinary:   Genitourinary Comments: Normal external genitalia, small amount of bleeding without clots, uterus nontender, no cervical motion tenderness, no adnexal tenderness or masses   Musculoskeletal: Normal range of motion  She exhibits no edema or tenderness  Neurological: She is alert and oriented to person, place, and time  No cranial nerve deficit or sensory deficit  She exhibits normal muscle tone  5/5 motor, nl sens   Skin: Skin is warm and dry  Psychiatric: She has a normal mood and affect  Her behavior is normal    Nursing note and vitals reviewed        Vital Signs  ED Triage Vitals   Temperature Pulse Respirations Blood Pressure SpO2   10/16/18 1458 10/16/18 1458 10/16/18 1458 10/16/18 1500 10/16/18 1458   97 9 °F (36 6 °C) 80 16 103/69 99 %      Temp Source Heart Rate Source Patient Position - Orthostatic VS BP Location FiO2 (%)   10/16/18 1458 10/16/18 1458 10/16/18 1500 10/16/18 1500 --   Temporal Monitor Sitting Left arm       Pain Score       10/16/18 1458       No Pain           Vitals:    10/16/18 1458 10/16/18 1500   BP:  103/69   Pulse: 80    Patient Position - Orthostatic VS:  Sitting       Visual Acuity      ED Medications  Medications - No data to display    Diagnostic Studies  Results Reviewed     Procedure Component Value Units Date/Time    Urine Microscopic [[id number]]  (Abnormal) Collected:  10/16/18 1532    Lab Status:  Final result Specimen:  Urine from Urine, Clean Catch Updated:  10/16/18 1612     RBC, UA 2-4 (A) /hpf      WBC, UA 2-4 (A) /hpf      Epithelial Cells Moderate (A) /hpf      Bacteria, UA Moderate (A) /hpf     Basic metabolic panel [[id number]] Collected:  10/16/18 1540    Lab Status:  Final result Specimen:  Blood from Arm, Right Updated:  10/16/18 1607     Sodium 139 mmol/L      Potassium 3 5 mmol/L      Chloride 104 mmol/L      CO2 28 mmol/L      ANION GAP 7 mmol/L      BUN 11 mg/dL      Creatinine 0 65 mg/dL      Glucose 85 mg/dL      Calcium 9 8 mg/dL      eGFR 105 ml/min/1 73sq m     Narrative:         National Kidney Disease Education Program recommendations are as follows:  GFR calculation is accurate only with a steady state creatinine  Chronic Kidney disease less than 60 ml/min/1 73 sq  meters  Kidney failure less than 15 ml/min/1 73 sq  meters      Eden Pack [[id number]]  (Normal) Collected:  10/16/18 1540    Lab Status:  Final result Specimen:  Blood from Arm, Right Updated:  10/16/18 1602     Protime 12 2 seconds      INR 1 05    APTT [[id number]]  (Normal) Collected:  10/16/18 1540    Lab Status:  Final result Specimen:  Blood from Arm, Right Updated:  10/16/18 1602     PTT 35 seconds     UA w Reflex to Microscopic w Reflex to Culture [[id number]]  (Abnormal) Collected:  10/16/18 1532    Lab Status:  Final result Specimen:  Urine from Urine, Clean Catch Updated:  10/16/18 1554     Color, UA Straw     Clarity, UA Clear     Specific Gravity, UA 1 010     pH, UA 5 5     Leukocytes, UA Negative     Nitrite, UA Negative     Protein, UA Negative mg/dl      Glucose, UA Negative mg/dl      Ketones, UA Trace (A) mg/dl      Urobilinogen, UA 0 2 E U /dl      Bilirubin, UA Negative     Blood, UA 3+ (A)    CBC and differential [[id number]]  (Abnormal) Collected:  10/16/18 1540    Lab Status:  Final result Specimen:  Blood from Arm, Right Updated:  10/16/18 1551     WBC 7 50 Thousand/uL      RBC 4 50 Million/uL      Hemoglobin 13 7 g/dL      Hematocrit 40 3 %      MCV 90 fL      MCH 30 4 pg      MCHC 33 9 g/dL      RDW 13 9 %      MPV 7 7 (L) fL      Platelets 143 Thousands/uL      nRBC 0 /100 WBCs      Neutrophils Relative 67 %      Lymphocytes Relative 26 %      Monocytes Relative 7 %      Eosinophils Relative 1 %      Basophils Relative 1 %      Neutrophils Absolute 5 00 Thousands/µL      Lymphocytes Absolute 1 90 Thousands/µL      Monocytes Absolute 0 50 Thousand/µL      Eosinophils Absolute 0 10 Thousand/µL      Basophils Absolute 0 00 Thousands/µL     Chlamydia/GC amplified DNA by PCR [[id number]] Collected:  10/16/18 1532    Lab Status: In process Specimen:  Urine from Urine, Other Updated:  10/16/18 1543    POCT pregnancy, urine [[id number]]  (Normal) Resulted:  10/16/18 1535    Lab Status:  Final result Updated:  10/16/18 1535     EXT PREG TEST UR (Ref: Negative) negative                 US pelvis complete w transvaginal   Final Result by Leticia Calderón (10/16 1758)   Heterogeneous uterus  No discrete fibroids  Thickened, heterogeneous endometrium measuring 1 5 cm  Possible solid lesion measuring 1 0 x 0 6 x 0 6 cm within the left ovary        Normal color and spectral Doppler flow within both ovaries         Signed by Yamilet Johnson Geeta Berman MD                 Procedures  Procedures       Phone Contacts  ED Phone Contact    ED Course  ED Course as of Oct 16 1813   Tue Oct 16, 2018   1812 Pt informed of possible left ovarian mass  She understands the need for GYN f/u for this and the vaginal bleeding                                MDM  CritCare Time    Disposition  Final diagnoses:   Dysfunctional uterine bleeding   Ovarian mass, left     Time reflects when diagnosis was documented in both MDM as applicable and the Disposition within this note     Time User Action Codes Description Comment    10/16/2018  5:08 PM Augusta Bernstein Add [N93 8] Dysfunctional uterine bleeding     10/16/2018  6:08 PM Tasha Freeman MIGUEL Add [N83 9] Ovarian mass, left       ED Disposition     ED Disposition Condition Comment    Discharge  [place] [facility] [place] discharge to home/self care  Condition at discharge: Stable        Follow-up Information     Follow up With Specialties Details Why Contact Info    Sandra Acuna MD [facility] Medicine Schedule an appointment as soon as possible for a visit in 1 day  [address] [facility] [address] [phone]      Gume Garcia MD Obstetrics and Gynecology, Obstetrics, Gynecology Schedule an appointment as soon as possible for a visit in 1 day He need further evaluation of the uterine bleeding and left ovarian mass  Return if you worsen or any new problems occur [address] [facility]  [phone]            Patient's Medications   Discharge Prescriptions    No medications on file     No discharge procedures on file      ED Provider  Electronically Signed by           Kendrick Hawkins MD  10/16/18 2012

## 2018-10-17 LAB
CHLAMYDIA DNA CVX QL NAA+PROBE: NORMAL
N GONORRHOEA DNA GENITAL QL NAA+PROBE: NORMAL

## 2018-10-18 ENCOUNTER — TELEPHONE (OUTPATIENT)
Dept: FAMILY MEDICINE CLINIC | Facility: CLINIC | Age: 48
End: 2018-10-18

## 2019-01-07 ENCOUNTER — APPOINTMENT (EMERGENCY)
Dept: RADIOLOGY | Facility: HOSPITAL | Age: 49
End: 2019-01-07
Payer: COMMERCIAL

## 2019-01-07 ENCOUNTER — HOSPITAL ENCOUNTER (EMERGENCY)
Facility: HOSPITAL | Age: 49
Discharge: HOME | End: 2019-01-08
Payer: COMMERCIAL

## 2019-01-07 ENCOUNTER — APPOINTMENT (EMERGENCY)
Dept: CT IMAGING | Facility: HOSPITAL | Age: 49
End: 2019-01-07
Payer: COMMERCIAL

## 2019-01-07 DIAGNOSIS — S80.211A ABRASION OF KNEE, BILATERAL: ICD-10-CM

## 2019-01-07 DIAGNOSIS — S80.01XA CONTUSION OF RIGHT KNEE, INITIAL ENCOUNTER: ICD-10-CM

## 2019-01-07 DIAGNOSIS — S76.019A HIP STRAIN: ICD-10-CM

## 2019-01-07 DIAGNOSIS — S80.212A ABRASION OF KNEE, BILATERAL: ICD-10-CM
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
## 2019-01-07 DIAGNOSIS — S82.891A CLOSED FRACTURE OF RIGHT ANKLE, INITIAL ENCOUNTER: ICD-10-CM

## 2019-01-07 DIAGNOSIS — S20.219A CONTUSION OF CHEST WALL, UNSPECIFIED LATERALITY, INITIAL ENCOUNTER: ICD-10-CM

## 2019-01-07 DIAGNOSIS — S80.02XA CONTUSION OF LEFT KNEE, INITIAL ENCOUNTER: ICD-10-CM

## 2019-01-07 LAB
ALBUMIN SERPL BCP-MCNC: 4.6 G/DL (ref 3.5–5.7)
ALP SERPL-CCNC: 102 U/L (ref 40–150)
ALT SERPL W P-5'-P-CCNC: 18 U/L (ref 7–52)
ANION GAP SERPL CALCULATED.3IONS-SCNC: 9 MMOL/L (ref 4–13)
APTT PPP: 35 SECONDS (ref 26–38)
AST SERPL W P-5'-P-CCNC: 21 U/L (ref 13–39)
ATRIAL RATE: 78 BPM
BASOPHILS # BLD AUTO: 0.1 THOUSANDS/ΜL (ref 0–0.1)
BASOPHILS NFR BLD AUTO: 0 % (ref 0–2)
BILIRUB SERPL-MCNC: 0.4 MG/DL (ref 0.2–1)
BUN SERPL-MCNC: 12 MG/DL (ref 7–25)
CALCIUM SERPL-MCNC: 9.5 MG/DL (ref 8.6–10.5)
CHLORIDE SERPL-SCNC: 103 MMOL/L (ref 98–107)
CO2 SERPL-SCNC: 25 MMOL/L (ref 21–31)
CREAT SERPL-MCNC: 0.76 MG/DL (ref 0.6–1.2)
EOSINOPHIL # BLD AUTO: 0 THOUSAND/ΜL (ref 0–0.61)
EOSINOPHIL NFR BLD AUTO: 0 % (ref 0–5)
ERYTHROCYTE [DISTWIDTH] IN BLOOD BY AUTOMATED COUNT: 13.6 % (ref 11.5–14.5)
ETHANOL SERPL-MCNC: <10 MG/DL
GFR SERPL CREATININE-BSD FRML MDRD: 93 ML/MIN/1.73SQ M
GLUCOSE SERPL-MCNC: 123 MG/DL (ref 65–99)
HCT VFR BLD AUTO: 43.1 % (ref 34.8–46.1)
HGB BLD-MCNC: 14.1 G/DL (ref 12–16)
INR PPP: 1.03 (ref 0.9–1.5)
LYMPHOCYTES # BLD AUTO: 1.4 THOUSANDS/ΜL (ref 0.6–4.47)
LYMPHOCYTES NFR BLD AUTO: 8 % (ref 21–51)
MCH RBC QN AUTO: 29.8 PG (ref 26–34)
MCHC RBC AUTO-ENTMCNC: 32.7 G/DL (ref 31–37)
MCV RBC AUTO: 91 FL (ref 81–99)
MONOCYTES # BLD AUTO: 0.9 THOUSAND/ΜL (ref 0.17–1.22)
MONOCYTES NFR BLD AUTO: 5 % (ref 2–12)
NEUTROPHILS # BLD AUTO: 16.2 THOUSANDS/ΜL (ref 1.4–6.5)
NEUTS SEG NFR BLD AUTO: 87 % (ref 42–75)
NRBC BLD AUTO-RTO: 0 /100 WBCS
P AXIS: 61 DEGREES
PLATELET # BLD AUTO: 334 THOUSANDS/UL (ref 149–390)
PMV BLD AUTO: 7.7 FL (ref 8.6–11.7)
POTASSIUM SERPL-SCNC: 3.5 MMOL/L (ref 3.5–5.5)
PR INTERVAL: 150 MS
PROT SERPL-MCNC: 8.1 G/DL (ref 6.4–8.9)
PROTHROMBIN TIME: 11.9 SECONDS (ref 10.2–13)
QRS AXIS: 69 DEGREES
QRSD INTERVAL: 90 MS
QT INTERVAL: 396 MS
QTC INTERVAL: 451 MS
RBC # BLD AUTO: 4.73 MILLION/UL (ref 3.9–5.2)
SODIUM SERPL-SCNC: 137 MMOL/L (ref 134–143)
T WAVE AXIS: 69 DEGREES
VENTRICULAR RATE: 78 BPM
WBC # BLD AUTO: 18.6 THOUSAND/UL (ref 4.8–10.8)

## 2019-01-07 PROCEDURE — 71250 CT THORAX DX C-: CPT

## 2019-01-07 PROCEDURE — 36415 COLL VENOUS BLD VENIPUNCTURE: CPT

## 2019-01-07 PROCEDURE — 80053 COMPREHEN METABOLIC PANEL: CPT

## 2019-01-07 PROCEDURE — 73610 X-RAY EXAM OF ANKLE: CPT

## 2019-01-07 PROCEDURE — 73140 X-RAY EXAM OF FINGER(S): CPT

## 2019-01-07 PROCEDURE — 73521 X-RAY EXAM HIPS BI 2 VIEWS: CPT

## 2019-01-07 PROCEDURE — 85730 THROMBOPLASTIN TIME PARTIAL: CPT

## 2019-01-07 PROCEDURE — 93010 ELECTROCARDIOGRAM REPORT: CPT | Performed by: INTERNAL MEDICINE

## 2019-01-07 PROCEDURE — 99285 EMERGENCY DEPT VISIT HI MDM: CPT

## 2019-01-07 PROCEDURE — 85610 PROTHROMBIN TIME: CPT

## 2019-01-07 PROCEDURE — 73564 X-RAY EXAM KNEE 4 OR MORE: CPT

## 2019-01-07 PROCEDURE — 80320 DRUG SCREEN QUANTALCOHOLS: CPT

## 2019-01-07 PROCEDURE — 85025 COMPLETE CBC W/AUTO DIFF WBC: CPT

## 2019-01-07 PROCEDURE — 74176 CT ABD & PELVIS W/O CONTRAST: CPT

## 2019-01-07 PROCEDURE — 93005 ELECTROCARDIOGRAM TRACING: CPT

## 2019-01-07 RX ORDER — GABAPENTIN 300 MG/1
300 CAPSULE ORAL 3 TIMES DAILY
Qty: 15 CAPSULE | Refills: 0 | Status: SHIPPED | OUTPATIENT
Start: 2019-01-07 | End: 2019-01-12

## 2019-01-07 RX ORDER — SODIUM CHLORIDE 9 MG/ML
125 INJECTION, SOLUTION INTRAVENOUS CONTINUOUS
Status: DISCONTINUED | OUTPATIENT
Start: 2019-01-07 | End: 2019-01-08 | Stop reason: HOSPADM

## 2019-01-07 RX ORDER — FENTANYL CITRATE 50 UG/ML
50 INJECTION, SOLUTION INTRAMUSCULAR; INTRAVENOUS ONCE
Status: DISCONTINUED | OUTPATIENT
Start: 2019-01-07 | End: 2019-01-07

## 2019-01-07 RX ORDER — DIPHENHYDRAMINE HCL 50 MG
50 CAPSULE ORAL EVERY 4 HOURS PRN
COMMUNITY

## 2019-01-07 RX ORDER — IBUPROFEN 400 MG/1
800 TABLET ORAL ONCE
Status: COMPLETED | OUTPATIENT
Start: 2019-01-07 | End: 2019-01-07

## 2019-01-07 RX ORDER — MAGNESIUM HYDROXIDE/ALUMINUM HYDROXICE/SIMETHICONE 120; 1200; 1200 MG/30ML; MG/30ML; MG/30ML
30 SUSPENSION ORAL ONCE
Status: COMPLETED | OUTPATIENT
Start: 2019-01-08 | End: 2019-01-07

## 2019-01-07 RX ADMIN — IBUPROFEN 800 MG: 400 TABLET ORAL at 22:45

## 2019-01-07 RX ADMIN — ALUMINUM HYDROXIDE, MAGNESIUM HYDROXIDE, AND SIMETHICONE 30 ML: 200; 200; 20 SUSPENSION ORAL at 23:56

## 2019-01-08 VITALS
TEMPERATURE: 97.7 F | RESPIRATION RATE: 16 BRPM | OXYGEN SATURATION: 98 % | SYSTOLIC BLOOD PRESSURE: 96 MMHG | HEART RATE: 80 BPM | HEIGHT: 63 IN | WEIGHT: 200 LBS | BODY MASS INDEX: 35.44 KG/M2 | DIASTOLIC BLOOD PRESSURE: 64 MMHG
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
## 2019-01-08 NOTE — DISCHARGE INSTRUCTIONS
Abrasion   WHAT YOU NEED TO KNOW:   An abrasion is a scrape on your skin  It happens when your skin rubs against a rough surface  Some examples of an abrasion include rug burn, a skinned elbow, or road rash  Abrasions can be many shapes and sizes  The wound may hurt, bleed, bruise, or swell  DISCHARGE INSTRUCTIONS:   Return to the emergency department if:   · The bleeding does not stop after 10 minutes of firm pressure  · You cannot rinse one or more foreign objects out of your wound  · You have red streaks on your skin coming from your wound  Contact your healthcare provider if:   · You have a fever or chills  · Your abrasion is red, warm, swollen, or draining pus  · You have questions or concerns about your condition or care  Care for your abrasion:   · Wash your hands and dry them with a clean towel  · Press a clean cloth against your wound to stop any bleeding  · Rinse your wound with a lot of clean water  Do not use harsh soap, alcohol, or iodine solutions  · Use a clean, wet cloth to remove any objects, such as small pieces of rocks or dirt  · Rub antibiotic ointment on your wound  This may help prevent infection and help your wound heal     · Cover the wound with a non-stick bandage  Change the bandage daily, and if gets wet or dirty  Follow up with your healthcare provider as directed:  Write down your questions so you remember to ask them during your visits  © 2017 [address] Information is for End User's use only and may not be sold, redistributed or otherwise used for commercial purposes  All illustrations and images included in CareNotes® are the copyrighted property of [employer]  or Girma Rubio  The above information is an  only  It is not intended as medical advice for individual conditions or treatments   Talk to your doctor, nurse or pharmacist before following any medical regimen to see if it is safe and effective for you     Abrasion   WHAT YOU NEED TO KNOW:   What is an abrasion? An abrasion is a scrape on your skin  It happens when your skin rubs against a rough surface  Some examples of an abrasion include rug burn, a skinned elbow, or road rash  Abrasions can be many shapes and sizes  The wound may hurt, bleed, bruise, or swell  How can I care for my abrasion? · Wash your hands and dry them with a clean towel  · Press a clean cloth against your wound to stop any bleeding  · Rinse your wound with a lot of clean water  Do not use harsh soap, alcohol, or iodine solutions  · Use a clean, wet cloth to remove any objects, such as small pieces of rocks or dirt  · Rub antibiotic ointment on your wound  This may help prevent infection and help your wound heal     · Cover the wound with a non-stick bandage  Change the bandage daily, and if gets wet or dirty  When should I seek immediate care? · The bleeding does not stop after 10 minutes of firm pressure  · You cannot rinse one or more foreign objects out of your wound  · You have red streaks on your skin coming from your wound  When should I contact my healthcare provider? · You have a fever or chills  · Your abrasion is red, warm, swollen, or draining pus  · You have questions or concerns about your condition or care  CARE AGREEMENT:   You have the right to help plan your care  Learn about your health condition and how it may be treated  Discuss treatment options with your caregivers to decide what care you want to receive  You always have the right to refuse treatment  The above information is an  only  It is not intended as medical advice for individual conditions or treatments  Talk to your doctor, nurse or pharmacist before following any medical regimen to see if it is safe and effective for you    © 2017 [address] [employer] Information is for End User's use only and may not be sold, redistributed or otherwise used for commercial purposes  All illustrations and images included in CareNotes® are the copyrighted property of [employer]  or Girma Rubio  Contusion in Adults   WHAT YOU NEED TO KNOW:   A contusion is a bruise that appears on your skin after an injury  A bruise happens when small blood vessels tear but skin does not  When blood vessels tear, blood leaks into nearby tissue, such as soft tissue or muscle  DISCHARGE INSTRUCTIONS:   Return to the emergency department if:   · You have new trouble moving the injured area  · You have tingling or numbness in or near the injured area  · Your hand or foot below the bruise gets cold or turns pale  Contact your healthcare provider if:   · You find a new lump in the injured area  · Your symptoms do not improve with treatment after 4 to 5 days  · You have questions or concerns about your condition or care  Medicines: You may need any of the following:  · NSAIDs  help decrease swelling and pain or fever  This medicine is available with or without a doctor's order  NSAIDs can cause stomach bleeding or kidney problems in certain people  If you take blood thinner medicine, always ask your healthcare provider if NSAIDs are safe for you  Always read the medicine label and follow directions  · Prescription pain medicine  may be given  Do not wait until the pain is severe before you take your medicine  · Take your medicine as directed  Contact your healthcare provider if you think your medicine is not helping or if you have side effects  Tell him of her if you are allergic to any medicine  Keep a list of the medicines, vitamins, and herbs you take  Include the amounts, and when and why you take them  Bring the list or the pill bottles to follow-up visits  Carry your medicine list with you in case of an emergency  Follow up with your healthcare provider as directed: You may need to return within a week to check your injury again   Write down your questions so you remember to ask them during your visits  Help a contusion heal:   · Rest the injured area  or use it less than usual  If you bruised your leg or foot, you may need crutches or a cane to help you walk  This will help you keep weight off your injured body part  · Apply ice  to decrease swelling and pain  Ice may also help prevent tissue damage  Use an ice pack, or put crushed ice in a plastic bag  Cover it with a towel and place it on your bruise for 15 to 20 minutes every hour or as directed  · Use compression  to support the area and decrease swelling  Wrap an elastic bandage around the area over the bruised muscle  Make sure the bandage is not too tight  You should be able to fit 1 finger between the bandage and your skin  · Elevate (raise) your injured body part  above the level of your heart to help decrease pain and swelling  Use pillows, blankets, or rolled towels to elevate the area as often as you can  · Do not drink alcohol  as directed  Alcohol may slow healing  · Do not stretch injured muscles  right after your injury  Ask your healthcare provider when and how you may safely stretch after your injury  Gentle stretches can help increase your flexibility  · Do not massage the area or put heating pads  on the bruise right after your injury  Heat and massage may slow healing  Your healthcare provider may tell you to apply heat after several days  At that time, heat will start to help the injury heal   Prevent another contusion:   · Stretch and warm up before you play sports or exercise  · Wear protective gear when you play sports  Examples are shin guards and padding  · If you begin a new physical activity, start slowly to give your body a chance to adjust   © 2017 [address] Information is for End User's use only and may not be sold, redistributed or otherwise used for commercial purposes   All illustrations and images included in CareNotes® are the copyrighted property of [employer]  or Girma Rubio  The above information is an  only  It is not intended as medical advice for individual conditions or treatments  Talk to your doctor, nurse or pharmacist before following any medical regimen to see if it is safe and effective for you  Contusion in Adults   WHAT YOU NEED TO KNOW:   What is a contusion? A contusion is a bruise that appears on your skin after an injury  A bruise happens when small blood vessels tear but skin does not  When blood vessels tear, blood leaks into nearby tissue, such as soft tissue or muscle  What causes a contusion? A hard object or a strained muscle can leave a bruise on your skin  A twisted knee or ankle can cause a bone bruise  You may get a bruise near an area where you have had blood taken for medical tests  What increases my risk for a contusion? · A bleeding disorder that makes you bleed more easily    · Kidney or liver disease, or an infection    · A family history of bleeding problems    · Medicines such as blood thinners or certain over-the-counter medicines and herbal medicines    · Weakened skin and muscles from older age or poor nutrition  What other signs and symptoms may I have with a contusion? · Pain that increases when you touch the bruise, walk, or use the area around the bruise    · Swelling or a lump at the site of the bruise or near it    · Red, blue, or black skin that may change to green or yellow after a few days           · Stiffness or problems moving the bruised area of your body  How is a contusion diagnosed? Your healthcare provider may ask about any injuries, infections, or bleeding problems you had in the past  He or she will check the skin over the injured area  He or she may touch it to see where it hurts  He or she may also check for problems you may have when you move your bruised area   You may need any of the following tests:  · Blood tests  may be used to check for blood disorders or to see how long it takes for your blood to clot  · Ultrasound  pictures may show how deep the bruise is and if any of your organs, such as your liver, are injured  · MRI  pictures may show if a hematoma (pooling of blood) has started to form  You may be given contrast liquid to help the pictures show up better  Tell the healthcare provider if you have ever had an allergic reaction to contrast liquid  Do not enter the MRI room with anything metal  Metal can cause serious injury  Tell the healthcare provider if you have any metal in or on your body  How is a contusion treated? Your bruise may heal without any treatment  Treatment depends on the part of your body that is injured, and how serious your injury is  You may need any of the following:  · NSAIDs , such as ibuprofen, help decrease swelling, pain, and fever  This medicine is available with or without a doctor's order  NSAIDs can cause stomach bleeding or kidney problems in certain people  If you take blood thinner medicine, always ask your healthcare provider if NSAIDs are safe for you  Always read the medicine label and follow directions  · Prescription pain medicine  may be given  Do not wait until the pain is severe before you take your medicine  · Aspiration  is a procedure to drain pooled blood in your muscle  This prevents increased pressure in the muscle  · Surgery  may be done to repair a tear in the muscle or relieve pressure in the muscle caused by swelling  What can I do to help my contusion heal?   · Rest the injured area  or use it less than usual  If you bruised your leg or foot, you may need crutches or a cane to help you walk  This will help you keep weight off your injured body part  · Apply ice  to decrease swelling and pain  Ice may also help prevent tissue damage  Use an ice pack, or put crushed ice in a plastic bag   Cover it with a towel and place it on your bruise for 15 to 20 minutes every hour or as directed  · Use compression  to support the area and decrease swelling  Wrap an elastic bandage around the area over the bruised muscle  Make sure the bandage is not too tight  You should be able to fit 1 finger between the bandage and your skin  · Elevate (raise) your injured body part  above the level of your heart to help decrease pain and swelling  Use pillows, blankets, or rolled towels to elevate the area as often as you can  · Do not drink alcohol  as directed  Alcohol may slow healing  · Do not stretch injured muscles  right after your injury  Ask your healthcare provider when and how you may safely stretch after your injury  Gentle stretches can help increase your flexibility  · Do not massage the area or put heating pads  on the bruise right after your injury  Heat and massage may slow healing  Your healthcare provider may tell you to apply heat after several days  At that time, heat will start to help the injury heal   How can I prevent a contusion? · Stretch and warm up before you play sports or exercise  · Wear protective gear when you play sports  Examples are shin guards and padding  · If you begin a new physical activity, start slowly to give your body a chance to adjust   When should I seek immediate care? · You have new trouble moving the injured area  · You have tingling or numbness in or near the injured area  · Your hand or foot below the bruise gets cold or turns pale  When should I contact my healthcare provider? · You find a new lump in the injured area  · Your symptoms do not improve with treatment after 4 to 5 days  · You have questions or concerns about your condition or care  CARE AGREEMENT:   You have the right to help plan your care  Learn about your health condition and how it may be treated  Discuss treatment options with your caregivers to decide what care you want to receive  You always have the right to refuse treatment  The above information is an  only  It is not intended as medical advice for individual conditions or treatments  Talk to your doctor, nurse or pharmacist before following any medical regimen to see if it is safe and effective for you  © 2017 [address] [employer] Information is for End User's use only and may not be sold, redistributed or otherwise used for commercial purposes  All illustrations and images included in CareNotes® are the copyrighted property of [employer]  or Girma Rubio  Groin Strain   WHAT YOU NEED TO KNOW:   A groin strain occurs when a muscle or tendon is stretched or torn  The groin is the area where your abdomen meets your upper leg  Tendons are cords of tissue that attach muscle to bone  DISCHARGE INSTRUCTIONS:   Rest your groin area: You will need to rest your groin area from activities that may cause you pain  This will help decrease the risk of more damage to your groin  Use crutches or a cane as directed  Ice your groin area: Ice your groin area to help decrease swelling and pain  Put crushed ice in a plastic bag and cover it with a towel  Put the ice on your groin area for 15 to 20 minutes every hour  Do this for as many days as directed  Wrap your groin:  Your healthcare provider will instruct you on how to wrap your groin with an elastic bandage or tape  When you wrap your groin, it becomes more stable  Wrapping your groin can help decrease your pain  Elevate the injured area:  Keep the leg on your injured side raised to help decrease pain and swelling in your groin area  Use pillows, blankets, or rolled towels to elevate your leg as often as you can  Medicine:   · Pain medicine: You may be given medicine such as aspirin or ibuprofen to decrease or take away pain  Do not wait until the pain is severe before you take your medicine      · Take your medicine as directed:  Call your healthcare provider if you think your medicine is not helping or if you have side effects  Tell him if you take any vitamins, herbs, or other medicines  Keep a list of the medicines you take  Include the amounts, and when and why you take them  Bring the list or the pill bottles to follow-up visits  Activity:  You may need to exercise the injured area after your pain and swelling have decreased  Exercises will help prevent stiffness in the injured area and increase strength  Return to your normal activities slowly  You could injure yourself again if you try to return to normal activity too soon  Return to your normal level of activity when:  · You do not have pain when you walk, run, or jump  · Your injured leg moves like your uninjured leg  · Your injured leg feels as strong as your uninjured leg  Prevent another injury:   · Warm up and stretch before you exercise  · Wear shoes that fit well  · Wear equipment to protect yourself when you play sports  · Do exercises as directed to build muscle strength  Stop if you feel pain or tightness in your groin  Follow up with your healthcare provider as directed:  Write down any questions you have so you remember to ask them in your follow-up visits  Contact your healthcare provider if:   · You have increased swelling and pain in your injured area  · You have increased groin pain when standing or with movement  · You have questions or concerns about your injury or treatment  © 2017 [address]  Information is for End User's use only and may not be sold, redistributed or otherwise used for commercial purposes  All illustrations and images included in CareNotes® are the copyrighted property of A [employer] M [employer]  or Girma Rubio  The above information is an  only  It is not intended as medical advice for individual conditions or treatments  Talk to your doctor, nurse or pharmacist before following any medical regimen to see if it is safe and effective for you      Motor Vehicle Accident   WHAT YOU NEED TO KNOW:   A motor vehicle accident (MVA) can cause injury from the impact or from being thrown around inside the car  You may have a bruise on your abdomen, chest, or neck from the seatbelt  You may also have pain in your face, neck, or back  You may have pain in your knee, hip, or thigh if your body hits the dash or the steering wheel  Muscle pain is commonly worse 1 to 2 days after an MVA  DISCHARGE INSTRUCTIONS:   Call 911 if:   · You have new or worsening chest pain or shortness of breath  Return to the emergency department if:   · You have new or worsening pain in your abdomen  · You have nausea and vomiting that does not get better  · You have a severe headache  · You have weakness, tingling, or numbness in your arms or legs  · You have new or worsening pain that makes it hard for you to move  Contact your healthcare provider if:   · You have pain that develops 2 to 3 days after the MVA  · You have questions or concerns about your condition or care  Medicines:   · Pain medicine: You may be given medicine to take away or decrease pain  Do not wait until the pain is severe before you take your medicine  · NSAIDs , such as ibuprofen, help decrease swelling, pain, and fever  This medicine is available with or without a doctor's order  NSAIDs can cause stomach bleeding or kidney problems in certain people  If you take blood thinner medicine, always ask if NSAIDs are safe for you  Always read the medicine label and follow directions  Do not give these medicines to children under 10months of age without direction from your child's healthcare provider  · Take your medicine as directed  Contact your healthcare provider if you think your medicine is not helping or if you have side effects  Tell him of her if you are allergic to any medicine  Keep a list of the medicines, vitamins, and herbs you take  Include the amounts, and when and why you take them  Bring the list or the pill bottles to follow-up visits  Carry your medicine list with you in case of an emergency  Follow up with your healthcare provider as directed:  Write down your questions so you remember to ask them during your visits  Safety tips:   · Always wear your seatbelt  This will help reduce serious injury from an MVA  · Use child safety seats  Your child needs to ride in a child safety seat made for his age, height, and weight  Ask your healthcare provider for more information about child safety seats  · Decrease speed  Drive the speed limit to reduce your risk for an MVA  · Do not drive if you are tired  You will react more slowly when you are tired  The slowed reaction time will increase your risk for an MVA  · Do not talk or text on your cell phone while you drive  You cannot respond fast enough in an emergency if you are distracted by texts or conversations  · Do not drink and drive  Use a designated   Call a taxi or get a ride home with someone if you have been drinking  Do not let your friends drive if they have been drinking alcohol  · Do not use illegal drugs and drive  You may be more tired or take risks that you normally would not take  Do not drive after you take prescription medicines that make you sleepy  Self-care:   · Use ice and heat  Ice helps decrease swelling and pain  Ice may also help prevent tissue damage  Use an ice pack, or put crushed ice in a plastic bag  Cover it with a towel and apply to your injured area for 15 to 20 minutes every hour, or as directed  After 2 days, use a heating pad on your injured area  Use heat as directed  · Gently stretch  Use gentle exercises to stretch your muscles after an MVA  Ask your healthcare provider for exercises you can do  © 2017 [employer] Information is for End User's use only and may not be sold, redistributed or otherwise used for commercial purposes   All illustrations and images included in CareNotes® are the copyrighted property of [employer]  or Girma Rubio  The above information is an  only  It is not intended as medical advice for individual conditions or treatments  Talk to your doctor, nurse or pharmacist before following any medical regimen to see if it is safe and effective for you  Ankle Fracture   WHAT YOU NEED TO KNOW:   An ankle fracture is a break in 1 or more of the bones in your ankle  DISCHARGE INSTRUCTIONS:   Call 911 for any of the following:   · You feel lightheaded, short of breath, and have chest pain  · You cough up blood  Return to the emergency department if:   · Your leg feels warm, tender, and painful  It may look swollen and red  · Blood soaks through your bandage  · You have severe pain in your ankle  · Your cast feels too tight  · Your foot or toes are cold or numb  · Your foot or toenails turn blue or gray  Contact your healthcare provider if:   · Your splint feels too tight  · Your swelling has increased or returned  · You have a fever  · Your pain does not go away, even after treatment  · You have questions or concerns about your condition or care  Medicines: You may need any of the following:  · Acetaminophen  decreases pain and fever  It is available without a doctor's order  Ask how much to take and how often to take it  Follow directions  Acetaminophen can cause liver damage if not taken correctly  · NSAIDs , such as ibuprofen, help decrease swelling, pain, and fever  This medicine is available with or without a doctor's order  NSAIDs can cause stomach bleeding or kidney problems in certain people  If you take blood thinner medicine, always ask your healthcare provider if NSAIDs are safe for you  Always read the medicine label and follow directions  · Prescription pain medicine  may be given  Ask your healthcare provider how to take this medicine safely      · Take your medicine as directed  Contact your healthcare provider if you think your medicine is not helping or if you have side effects  Tell him or her if you are allergic to any medicine  Keep a list of the medicines, vitamins, and herbs you take  Include the amounts, and when and why you take them  Bring the list or the pill bottles to follow-up visits  Carry your medicine list with you in case of an emergency  Follow up with your healthcare provider in 1 to 2 days: Your fracture may need to be reduced (bones pushed back into place) or you may need surgery  Write down your questions so you remember to ask them during your visits  Support devices: You will be given a brace, cast, or splint to limit your movement and protect your ankle  You may need to use crutches to protect your ankle and decrease your pain as you move around  Do not remove your device and do not put weight on your injured ankle  Splint and cast care:  Cover the splint or cast before you bathe so it does not get wet  Tape 2 plastic trash bags to your skin above the cast  Try to keep your ankle out of the water as much as possible  Rest:  Rest your ankle so that it can heal  Return to normal activities as directed  Ice: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

## 2019-05-22 ENCOUNTER — OFFICE VISIT (OUTPATIENT)
Dept: URGENT CARE | Facility: CLINIC | Age: 49
End: 2019-05-22
Payer: COMMERCIAL

## 2019-05-22 VITALS
OXYGEN SATURATION: 98 % | TEMPERATURE: 98.3 F | HEART RATE: 82 BPM | DIASTOLIC BLOOD PRESSURE: 68 MMHG | SYSTOLIC BLOOD PRESSURE: 103 MMHG | RESPIRATION RATE: 18 BRPM

## 2019-05-22 DIAGNOSIS — H81.10 BENIGN PAROXYSMAL POSITIONAL VERTIGO, UNSPECIFIED LATERALITY: ICD-10-CM

## 2019-05-22 DIAGNOSIS — B37.3 VAGINAL CANDIDA: Primary | ICD-10-CM

## 2019-05-22 PROCEDURE — G0463 HOSPITAL OUTPT CLINIC VISIT: HCPCS | Performed by: NURSE PRACTITIONER

## 2019-05-22 PROCEDURE — S9083 URGENT CARE CENTER GLOBAL: HCPCS | Performed by: NURSE PRACTITIONER

## 2019-05-22 RX ORDER — FLUCONAZOLE 150 MG/1
150 TABLET ORAL ONCE
Qty: 1 TABLET | Refills: 0 | Status: SHIPPED | OUTPATIENT
Start: 2019-05-22 | End: 2019-05-22

## 2019-05-22 RX ORDER — MECLIZINE HCL 12.5 MG/1
12.5 TABLET ORAL EVERY 8 HOURS PRN
Qty: 20 TABLET | Refills: 0 | Status: SHIPPED | OUTPATIENT
Start: 2019-05-22

## 2019-05-23 ENCOUNTER — OFFICE VISIT (OUTPATIENT)
Dept: URGENT CARE | Facility: CLINIC | Age: 49
End: 2019-05-23
Payer: COMMERCIAL

## 2019-05-23 VITALS
HEART RATE: 85 BPM | TEMPERATURE: 98 F | DIASTOLIC BLOOD PRESSURE: 80 MMHG | BODY MASS INDEX: 35.44 KG/M2 | SYSTOLIC BLOOD PRESSURE: 110 MMHG | OXYGEN SATURATION: 97 % | WEIGHT: 200 LBS | RESPIRATION RATE: 16 BRPM | HEIGHT: 63 IN

## 2019-05-23 DIAGNOSIS — H69.93 DISORDER OF BOTH EUSTACHIAN TUBES: Primary | ICD-10-CM

## 2019-05-23 PROCEDURE — S9083 URGENT CARE CENTER GLOBAL: HCPCS | Performed by: NURSE PRACTITIONER

## 2019-05-23 PROCEDURE — G0463 HOSPITAL OUTPT CLINIC VISIT: HCPCS | Performed by: NURSE PRACTITIONER

## 2021-05-12 ENCOUNTER — OFFICE VISIT (OUTPATIENT)
Dept: URGENT CARE | Facility: CLINIC | Age: 51
End: 2021-05-12
Payer: MEDICARE

## 2021-05-12 VITALS
HEART RATE: 75 BPM | SYSTOLIC BLOOD PRESSURE: 96 MMHG | RESPIRATION RATE: 18 BRPM | TEMPERATURE: 97.2 F | OXYGEN SATURATION: 98 % | DIASTOLIC BLOOD PRESSURE: 59 MMHG

## 2021-05-12 DIAGNOSIS — L30.9 DERMATITIS: ICD-10-CM

## 2021-05-12 DIAGNOSIS — L03.011 CELLULITIS OF RIGHT INDEX FINGER: Primary | ICD-10-CM

## 2021-05-12 DIAGNOSIS — M25.551 HIP PAIN, ACUTE, RIGHT: ICD-10-CM

## 2021-05-12 PROCEDURE — 99213 OFFICE O/P EST LOW 20 MIN: CPT | Performed by: FAMILY MEDICINE

## 2021-05-12 PROCEDURE — G0463 HOSPITAL OUTPT CLINIC VISIT: HCPCS | Performed by: FAMILY MEDICINE

## 2021-05-12 RX ORDER — CEPHALEXIN 500 MG/1
500 CAPSULE ORAL EVERY 6 HOURS SCHEDULED
Qty: 20 CAPSULE | Refills: 0 | Status: SHIPPED | OUTPATIENT
Start: 2021-05-12 | End: 2021-05-17
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
## 2021-05-12 NOTE — PATIENT INSTRUCTIONS
Take the antibiotic for the mild finger infection 4 times a day for the next 5 days  Hydrocortisone cream twice a day for 1-2 weeks for the finger rash  Continue ibuprofen for hip and ankle pain  I would recommend calling physical therapy to see if your insurance will cover a self-referral   Follow up with 1 of the family doctors from the list given to further workup the abdominal discomfort and abnormal bowel habits there have been current for the past year  Follow up with PCP in 3-5 days  Proceed to  ER if symptoms worsen

## 2021-06-09 ENCOUNTER — TELEPHONE (OUTPATIENT)
Dept: INTERNAL MEDICINE CLINIC | Facility: CLINIC | Age: 51
End: 2021-06-09

## 2021-06-09 NOTE — TELEPHONE ENCOUNTER
Pt called stating she forgot about yesterday's appt  Appt RS  During conversation stated she has blood in her stool  Informed her to go to ED  Stated went to [facility] in [facility] and they informed her they could not do testing  Stated she had it for over a year will wait for appt

## 2021-06-16 ENCOUNTER — TELEPHONE (OUTPATIENT)
Dept: INTERNAL MEDICINE CLINIC | Facility: CLINIC | Age: 51
End: 2021-06-16
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
